# Patient Record
Sex: MALE | Race: OTHER | Employment: FULL TIME | URBAN - METROPOLITAN AREA
[De-identification: names, ages, dates, MRNs, and addresses within clinical notes are randomized per-mention and may not be internally consistent; named-entity substitution may affect disease eponyms.]

---

## 2019-05-12 ENCOUNTER — HOSPITAL ENCOUNTER (EMERGENCY)
Age: 34
Discharge: HOME | End: 2019-05-12
Attending: EMERGENCY MEDICINE
Payer: SELF-PAY

## 2019-05-12 VITALS
TEMPERATURE: 98 F | OXYGEN SATURATION: 100 % | DIASTOLIC BLOOD PRESSURE: 72 MMHG | HEIGHT: 66 IN | RESPIRATION RATE: 20 BRPM | SYSTOLIC BLOOD PRESSURE: 120 MMHG | HEART RATE: 98 BPM | BODY MASS INDEX: 34.55 KG/M2 | WEIGHT: 215 LBS

## 2019-05-12 DIAGNOSIS — H00.025 HORDEOLUM INTERNUM LEFT LOWER EYELID: Primary | ICD-10-CM

## 2019-05-12 PROCEDURE — 99281 EMR DPT VST MAYX REQ PHY/QHP: CPT

## 2019-05-12 RX ORDER — ERYTHROMYCIN 5 MG/G
OINTMENT OPHTHALMIC
Qty: 3.5 G | Refills: 0 | Status: SHIPPED | OUTPATIENT
Start: 2019-05-12

## 2019-05-12 NOTE — ED PROVIDER NOTES
36 y.o. male with no significant past medical history who presents from home via private vehicle with chief complaint of left eye swelling. Patient reports left eye began swelling 2 days ago that is painful to touch. He has applied warm compress, but it has not improved. Denies any previous similar occurrences. Specifically denies eye discharge and any other pain or symptoms. There are no other acute medical concerns at this time. Social hx:None PCP: None Note written by Justyna Evans, as dictated by Kiana Mahajan MD 7:31 PM 
 
The history is provided by the patient. No  was used. No past medical history on file. No past surgical history on file. No family history on file. Social History Socioeconomic History  Marital status: SINGLE Spouse name: Not on file  Number of children: Not on file  Years of education: Not on file  Highest education level: Not on file Occupational History  Not on file Social Needs  Financial resource strain: Not on file  Food insecurity:  
  Worry: Not on file Inability: Not on file  Transportation needs:  
  Medical: Not on file Non-medical: Not on file Tobacco Use  Smoking status: Not on file Substance and Sexual Activity  Alcohol use: Not on file  Drug use: Not on file  Sexual activity: Not on file Lifestyle  Physical activity:  
  Days per week: Not on file Minutes per session: Not on file  Stress: Not on file Relationships  Social connections:  
  Talks on phone: Not on file Gets together: Not on file Attends Tenriism service: Not on file Active member of club or organization: Not on file Attends meetings of clubs or organizations: Not on file Relationship status: Not on file  Intimate partner violence:  
  Fear of current or ex partner: Not on file Emotionally abused: Not on file Physically abused: Not on file Forced sexual activity: Not on file Other Topics Concern  Not on file Social History Narrative  Not on file ALLERGIES: Patient has no known allergies. Review of Systems Eyes: Positive for pain. Negative for discharge. All other systems reviewed and are negative. Vitals:  
 05/12/19 1906 BP: 120/72 Pulse: 98 Resp: 20 Temp: 98 °F (36.7 °C) SpO2: 100% Weight: 97.5 kg (215 lb) Height: 5' 6\" (1.676 m) Physical Exam  
Constitutional: He appears well-developed and well-nourished. No distress. HENT:  
Head: Normocephalic and atraumatic. Eyes: Conjunctivae and EOM are normal. Left eye exhibits discharge and hordeolum. Left conjunctiva is not injected. Has left indurated hordeolum internum of the left lower lid with some scant mucoid drainage. No conjunctival injection. Normal eye movements without pain. No extension over face with mild erythma overlying lid. Neck: Neck supple. No tracheal deviation present. Cardiovascular: Normal rate and regular rhythm. Pulmonary/Chest: Effort normal. No respiratory distress. Abdominal: He exhibits no distension. Musculoskeletal: Normal range of motion. He exhibits no deformity. Neurological: He is alert. No cranial nerve deficit. Skin: Skin is warm and dry. There is erythema. Psychiatric: His behavior is normal.  
Nursing note and vitals reviewed. Note written by Rayleen Kussmaul, Scribe, as dictated by Kristina Sahni MD 7:31 PM 
 
MDM 
  
73-year-old male presents with uncomplicated left lower lid hordeolum internum. We discussed warm compresses and antibiotic ointment from the lid to help with spontaneous rupture. No signs of cellulitis or other complicating feature currently. He is provided a referral to ophthalmology for outpatient followup to consider drainage. Procedures